# Patient Record
Sex: FEMALE | Race: WHITE | URBAN - METROPOLITAN AREA
[De-identification: names, ages, dates, MRNs, and addresses within clinical notes are randomized per-mention and may not be internally consistent; named-entity substitution may affect disease eponyms.]

---

## 2022-05-05 ENCOUNTER — OFFICE VISIT (OUTPATIENT)
Dept: URBAN - METROPOLITAN AREA CLINIC 64 | Facility: CLINIC | Age: 62
End: 2022-05-05
Payer: COMMERCIAL

## 2022-05-05 DIAGNOSIS — H25.13 AGE-RELATED NUCLEAR CATARACT, BILATERAL: ICD-10-CM

## 2022-05-05 DIAGNOSIS — H34.8310 TRIB RTNL VEIN OCCLUSION, RIGHT EYE, WITH MACULAR EDEMA: Primary | ICD-10-CM

## 2022-05-05 PROCEDURE — 92134 CPTRZ OPH DX IMG PST SGM RTA: CPT | Performed by: OPHTHALMOLOGY

## 2022-05-05 PROCEDURE — 99204 OFFICE O/P NEW MOD 45 MIN: CPT | Performed by: OPHTHALMOLOGY

## 2022-05-05 ASSESSMENT — INTRAOCULAR PRESSURE
OD: 12
OS: 11

## 2022-05-05 NOTE — IMPRESSION/PLAN
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 Meza Ards Plan: BRVO w CME OD   Prior injx Dr. Meza Ards  -- referred by Dr. Stefan Byrnes. TODAY txfr to BDP -- now only rare micro-cyst, no symptoms. However, high risk of recur. May need addt'l injx or FLTx future. NO injx today NEED to get an FA to properly assess - BRVO branch w few cysts - fovea intact RTC 4-5w overbook OK  - Pt has Cerebral Palsy . . . FA BASELINE
   LIKELY will do another series of 3 injx Summer '22. IF add'tl injx required, may add FLTx in Fall '22.

## 2022-05-05 NOTE — IMPRESSION/PLAN
Impression: Age-related nuclear cataract Plan: Keep all other care w Dr. Hannah Nixon et al.  Send letter.

## 2022-06-02 ENCOUNTER — OFFICE VISIT (OUTPATIENT)
Dept: URBAN - METROPOLITAN AREA CLINIC 64 | Facility: CLINIC | Age: 62
End: 2022-06-02
Payer: COMMERCIAL

## 2022-06-02 DIAGNOSIS — H34.8310 TRIB RTNL VEIN OCCLUSION, RIGHT EYE, WITH MACULAR EDEMA: Primary | ICD-10-CM

## 2022-06-02 DIAGNOSIS — H25.13 AGE-RELATED NUCLEAR CATARACT, BILATERAL: ICD-10-CM

## 2022-06-02 PROCEDURE — 67028 INJECTION EYE DRUG: CPT | Performed by: OPHTHALMOLOGY

## 2022-06-02 PROCEDURE — 92134 CPTRZ OPH DX IMG PST SGM RTA: CPT | Performed by: OPHTHALMOLOGY

## 2022-06-02 PROCEDURE — 92235 FLUORESCEIN ANGRPH MLTIFRAME: CPT | Performed by: OPHTHALMOLOGY

## 2022-06-02 PROCEDURE — 92250 FUNDUS PHOTOGRAPHY W/I&R: CPT | Performed by: OPHTHALMOLOGY

## 2022-06-02 ASSESSMENT — INTRAOCULAR PRESSURE
OD: 16
OS: 14

## 2022-06-02 NOTE — IMPRESSION/PLAN
Impression: Age-related nuclear cataract Plan: Keep all other care w Dr. Valentin Bradley et al.  Send letter.    TODAY exam mild cataract

## 2022-07-19 ENCOUNTER — OFFICE VISIT (OUTPATIENT)
Dept: URBAN - METROPOLITAN AREA CLINIC 64 | Facility: CLINIC | Age: 62
End: 2022-07-19
Payer: COMMERCIAL

## 2022-07-19 DIAGNOSIS — H34.8310 TRIBUTARY (BRANCH) RETINAL VEIN OCCLUSION, RIGHT EYE, WITH MACULAR EDEMA: Primary | ICD-10-CM

## 2022-07-19 DIAGNOSIS — H25.13 AGE-RELATED NUCLEAR CATARACT, BILATERAL: ICD-10-CM

## 2022-07-19 PROCEDURE — 99214 OFFICE O/P EST MOD 30 MIN: CPT | Performed by: OPHTHALMOLOGY

## 2022-07-19 PROCEDURE — 67028 INJECTION EYE DRUG: CPT | Performed by: OPHTHALMOLOGY

## 2022-07-19 PROCEDURE — 92134 CPTRZ OPH DX IMG PST SGM RTA: CPT | Performed by: OPHTHALMOLOGY

## 2022-07-19 ASSESSMENT — INTRAOCULAR PRESSURE
OD: 10
OS: 12

## 2022-07-19 NOTE — IMPRESSION/PLAN
Impression: BRVO w CME OD [id number] Prior injx Dr. Glen Schultz Plan: hx: Brittni Remy w CME OD   Prior injx Dr. Glen Schultz  --r/b Dr. Shaylee Crenshaw [facility] .Txfr to BDP w rare micro-cyst, less symptoms. . . high risk of recur. May need addt'l inj or FLTx]] FA confirmed BRVO branch w few cysts - Pt has Cerebral Palsy TODAY add Avastin OD inj (proc note) . . . . Future Exam ? 
   RTC 5-6w ND/inj/OCT plan Avastin OD #3/4 (Series Summer '22). Future FLTx ? NOW w add'tl injx required, likely add FLTx sup/temp in Fall '22. May need 4inj?

## 2022-07-19 NOTE — IMPRESSION/PLAN
Impression: Age-related nuclear cataract Plan:   TODAY exam mild cataract confirmed 1+ NSC  ---  Keep all other care w Dr. Mayo Fletcher et al.  Send letter.

## 2022-09-13 ENCOUNTER — OFFICE VISIT (OUTPATIENT)
Dept: URBAN - METROPOLITAN AREA CLINIC 64 | Facility: CLINIC | Age: 62
End: 2022-09-13
Payer: COMMERCIAL

## 2022-09-13 DIAGNOSIS — H34.8310 TRIBUTARY (BRANCH) RETINAL VEIN OCCLUSION, RIGHT EYE, WITH MACULAR EDEMA: Primary | ICD-10-CM

## 2022-09-13 PROCEDURE — 92134 CPTRZ OPH DX IMG PST SGM RTA: CPT | Performed by: OPHTHALMOLOGY

## 2022-09-13 PROCEDURE — 67028 INJECTION EYE DRUG: CPT | Performed by: OPHTHALMOLOGY

## 2022-09-13 ASSESSMENT — INTRAOCULAR PRESSURE
OS: 14
OD: 18

## 2022-09-13 NOTE — IMPRESSION/PLAN
Impression: BRVO w CME OD [id number] Prior injx Dr. Anjelica Fitzpatrick Plan: hx: Georgia John w CME OD   Prior injx Dr. Anjelica Fitzpatrick  --r/b Dr. Cannon Meigs. Alverto Sheerer .Txfr to BDP w rare micro-cyst, less symptoms. . . high risk of recur. May need addt'l inj or FLTx]] FA confirmed BRVO branch w few cysts - Pt has Cerebral Palsy TODAY add Avastin OD inj (proc note) . . . . Future Exam ? 
   RTC 5-6w dil, OCT, eval - h/o Avastin OD #4/4 (Summer '22). Then FLTx ? NOW w add'tl injx required, likely add FLTx sup/temp in Fall '22. May need 4inj?

## 2022-10-20 ENCOUNTER — OFFICE VISIT (OUTPATIENT)
Dept: URBAN - METROPOLITAN AREA CLINIC 64 | Facility: CLINIC | Age: 62
End: 2022-10-20
Payer: COMMERCIAL

## 2022-10-20 DIAGNOSIS — H34.8310 TRIBUTARY (BRANCH) RETINAL VEIN OCCLUSION, RIGHT EYE, WITH MACULAR EDEMA: Primary | ICD-10-CM

## 2022-10-20 DIAGNOSIS — H25.13 AGE-RELATED NUCLEAR CATARACT, BILATERAL: ICD-10-CM

## 2022-10-20 PROCEDURE — 92134 CPTRZ OPH DX IMG PST SGM RTA: CPT | Performed by: OPHTHALMOLOGY

## 2022-10-20 PROCEDURE — 67028 INJECTION EYE DRUG: CPT | Performed by: OPHTHALMOLOGY

## 2022-10-20 PROCEDURE — 99214 OFFICE O/P EST MOD 30 MIN: CPT | Performed by: OPHTHALMOLOGY

## 2022-10-20 ASSESSMENT — INTRAOCULAR PRESSURE
OS: 18
OD: 18

## 2022-10-20 NOTE — IMPRESSION/PLAN
Impression: BRVO w CME OD [id number] Prior injx Dr. Florentino Lee RECUR edema -- RESUME inj '22 Plan: hx: Mike Nava w CME OD   Prior injx Dr. Florentino Lee  --r/b Dr. Yolanda Hunt. Terrell Campbell .Txfr to BDP w rare micro-cyst, less symptoms. . . high risk of recur. May need addt'l inj or FLTx]] Pt has Cerebral Palsy -- FA confirmed BRVO branch w few cysts -
    TODAY Avastin OD inj (proc note) . . . . Future FLTx ? *(pt hesitant?)
    RTC 5-6w plan ND/inj pos OCT -- PLAN Avastin OD -- Future extend / reduce injx ? NOW w add'tl injx required, will CONTINUE injx. Fall '22 pt is CAUTIOUS ABOUT FLTx and would like to do [address] FUTURE if change mind, could RE-CONSIDER  add FLTx sup/temp OD?

## 2022-10-20 NOTE — IMPRESSION/PLAN
Impression: Age-related nuclear cataract Plan: TODAY updated repeated exam mild 1+ NSC  --- 
     Keep all other care w Dr. Jazmin Kirk et al.  Send letter.

## 2022-12-01 ENCOUNTER — OFFICE VISIT (OUTPATIENT)
Dept: URBAN - METROPOLITAN AREA CLINIC 64 | Facility: CLINIC | Age: 62
End: 2022-12-01
Payer: COMMERCIAL

## 2022-12-01 DIAGNOSIS — H34.8310 TRIBUTARY (BRANCH) RETINAL VEIN OCCLUSION, RIGHT EYE, WITH MACULAR EDEMA: Primary | ICD-10-CM

## 2022-12-01 PROCEDURE — 67028 INJECTION EYE DRUG: CPT | Performed by: OPHTHALMOLOGY

## 2022-12-01 PROCEDURE — 92134 CPTRZ OPH DX IMG PST SGM RTA: CPT | Performed by: OPHTHALMOLOGY

## 2022-12-01 ASSESSMENT — INTRAOCULAR PRESSURE
OS: 17
OD: 18

## 2022-12-01 NOTE — IMPRESSION/PLAN
Impression: BRVO w CME OD [id number] Prior injx Dr. Lisa Devlin -- C. Palsy pt RECUR edema -- RESUME inj '22 Plan: hx: Cloteal Hari w CME OD   Prior injx Dr. Lisa Devlin  --r/b Dr. Tamie Rao. Rene Manifold .Txfr to BDP w rare micro-cyst, less symptoms. . . high risk of recur. May need addt'l inj or FLTx]] Pt has Cerebral Palsy -- FA confirmed BRVO branch w few cysts -
     TODAY Avastin OD inj (proc note) . . . Rene Manifold (Future FLTx ? but  *pt hesitant* ?)
    RTC 5-6w dil, pos colors, eval - h/o Avstn OD -- Future extnd /reduce injx ? NOW w add'tl injx required, will CONTINUE injx. Fall '22 pt is CAUTIOUS ABOUT FLTx and would like to do [address] FUTURE if change mind, could RE-CONSIDER  add FLTx sup/temp OD?

## 2023-01-04 ENCOUNTER — OFFICE VISIT (OUTPATIENT)
Facility: LOCATION | Age: 63
End: 2023-01-04
Payer: COMMERCIAL

## 2023-01-04 DIAGNOSIS — H34.8310 TRIB RTNL VEIN OCCLUSION, RIGHT EYE, WITH MACULAR EDEMA: Primary | ICD-10-CM

## 2023-01-04 DIAGNOSIS — H25.13 AGE-RELATED NUCLEAR CATARACT, BILATERAL: ICD-10-CM

## 2023-01-04 PROCEDURE — 92134 CPTRZ OPH DX IMG PST SGM RTA: CPT | Performed by: OPHTHALMOLOGY

## 2023-01-04 PROCEDURE — 92002 INTRM OPH EXAM NEW PATIENT: CPT | Performed by: OPHTHALMOLOGY

## 2023-01-04 ASSESSMENT — INTRAOCULAR PRESSURE
OD: 17
OS: 14

## 2023-01-04 NOTE — IMPRESSION/PLAN
Impression: BRVO w CME OD [id number] Prior injx Dr. Jhonny Jackson -- C. Palsy pt RECUR edema -- RESUME inj '22 Plan: Exam and OCT demonstrate CME 5 wks since last Avastin. Rec Avastin today. Will schedule due to authorization.   

RTC 4 wks with OCT OU, poss Avastin OD

## 2023-01-04 NOTE — IMPRESSION/PLAN
Impression: Age-related nuclear cataract Plan: TODAY updated repeated exam mild 1+ NSC  --- 
     Keep all other care w Dr. Jalen Oneill et al.  Send letter.

## 2023-02-01 ENCOUNTER — OFFICE VISIT (OUTPATIENT)
Facility: LOCATION | Age: 63
End: 2023-02-01
Payer: COMMERCIAL

## 2023-02-01 DIAGNOSIS — H34.8310 TRIB RTNL VEIN OCCLUSION, RIGHT EYE, WITH MACULAR EDEMA: Primary | ICD-10-CM

## 2023-02-01 DIAGNOSIS — H25.13 AGE-RELATED NUCLEAR CATARACT, BILATERAL: ICD-10-CM

## 2023-02-01 DIAGNOSIS — G51.0 BELL'S PALSY: ICD-10-CM

## 2023-02-01 PROCEDURE — 99214 OFFICE O/P EST MOD 30 MIN: CPT | Performed by: OPHTHALMOLOGY

## 2023-02-01 PROCEDURE — 67028 INJECTION EYE DRUG: CPT | Performed by: OPHTHALMOLOGY

## 2023-02-01 PROCEDURE — 92134 CPTRZ OPH DX IMG PST SGM RTA: CPT | Performed by: OPHTHALMOLOGY

## 2023-02-01 ASSESSMENT — INTRAOCULAR PRESSURE
OD: 14
OS: 12

## 2023-02-01 NOTE — IMPRESSION/PLAN
Impression: Age-related nuclear cataract Plan: TODAY updated repeated exam mild 1+ NSC  --- 
     Keep all other care w Dr. Valentin Bradley et al.  Send letter.

## 2023-02-01 NOTE — IMPRESSION/PLAN
Impression: BRVO w CME OD [id number] Prior injx Dr. Gonzalez Shadow -- C. Palsy pt RECUR edema -- RESUME inj '22
s/p Avastin Dr. Tiffanie Tracy 12/1/2022 Plan: Exam and OCT demonstrate CME worse at 9 wk today vs 5 wks since last Avastin. Rec Avastin today. R/B/A discussed and patient elects to proceed. Intravitreal Avastin injected OD today without complication.  

RTC 4 wks OCT OU, straight Avastin OD #1/3

## 2023-02-01 NOTE — IMPRESSION/PLAN
Impression: Bell's palsy: G51.0. Plan: Affected right side of the face. Exam demonstrates Mild ptosis. No ectropion. Rec observation.

## 2023-04-26 ENCOUNTER — PROCEDURE (OUTPATIENT)
Facility: LOCATION | Age: 63
End: 2023-04-26
Payer: COMMERCIAL

## 2023-04-26 DIAGNOSIS — H34.8310 TRIBUTARY (BRANCH) RETINAL VEIN OCCLUSION, RIGHT EYE, WITH MACULAR EDEMA: Primary | ICD-10-CM

## 2023-04-26 PROCEDURE — 67028 INJECTION EYE DRUG: CPT | Performed by: OPHTHALMOLOGY

## 2023-04-26 PROCEDURE — 92134 CPTRZ OPH DX IMG PST SGM RTA: CPT | Performed by: OPHTHALMOLOGY

## 2023-04-26 ASSESSMENT — INTRAOCULAR PRESSURE
OS: 14
OD: 17

## 2023-06-07 ENCOUNTER — OFFICE VISIT (OUTPATIENT)
Facility: LOCATION | Age: 63
End: 2023-06-07
Payer: COMMERCIAL

## 2023-06-07 DIAGNOSIS — H25.13 AGE-RELATED NUCLEAR CATARACT, BILATERAL: ICD-10-CM

## 2023-06-07 DIAGNOSIS — G51.0 BELL'S PALSY: ICD-10-CM

## 2023-06-07 DIAGNOSIS — H02.409 PTOSIS OF EYELID: ICD-10-CM

## 2023-06-07 DIAGNOSIS — H34.8310 TRIB RTNL VEIN OCCLUSION, RIGHT EYE, WITH MACULAR EDEMA: Primary | ICD-10-CM

## 2023-06-07 PROCEDURE — 99214 OFFICE O/P EST MOD 30 MIN: CPT | Performed by: OPHTHALMOLOGY

## 2023-06-07 PROCEDURE — 67028 INJECTION EYE DRUG: CPT | Performed by: OPHTHALMOLOGY

## 2023-06-07 PROCEDURE — 92134 CPTRZ OPH DX IMG PST SGM RTA: CPT | Performed by: OPHTHALMOLOGY

## 2023-06-07 ASSESSMENT — INTRAOCULAR PRESSURE
OD: 14
OS: 16

## 2023-06-07 NOTE — IMPRESSION/PLAN
Impression: BRVO w CME OD [id number]
-Prior injx Dr. Sera Nguyen -- C. Palsy pt
-RECUR edema -- RESUME inj '22
-s/p Avastin Dr. Mariam Vega 12/1/2022
-s/p Avastin 04/26/2023-RCA Plan: Exam and OCT demonstrate CME still persists at 6 wks since last Avastin. Rec Avastin today and switch to [address]. R/B/A discussed and patient elects to proceed. Intravitreal Avastin injected OD today without complication.  

RTC 6-8 wks with OCT OU, poss [place]

## 2023-06-07 NOTE — IMPRESSION/PLAN
Impression: Age-related nuclear cataract Plan: Patient notes glare. Exam demonstrates a moderate cataract which has slowly progressed. Discussed R/B/A of surgery vs observation. Recommend observation. Warning symptoms discussed.

## 2023-08-02 ENCOUNTER — OFFICE VISIT (OUTPATIENT)
Facility: LOCATION | Age: 63
End: 2023-08-02
Payer: COMMERCIAL

## 2023-08-02 DIAGNOSIS — H34.8310 TRIBUTARY (BRANCH) RETINAL VEIN OCCLUSION, RIGHT EYE, WITH MACULAR EDEMA: Primary | ICD-10-CM

## 2023-08-02 DIAGNOSIS — H25.13 AGE-RELATED NUCLEAR CATARACT, BILATERAL: ICD-10-CM

## 2023-08-02 DIAGNOSIS — G51.0 BELL'S PALSY: ICD-10-CM

## 2023-08-02 DIAGNOSIS — H02.409 PTOSIS OF EYELID: ICD-10-CM

## 2023-08-02 PROCEDURE — 92134 CPTRZ OPH DX IMG PST SGM RTA: CPT | Performed by: OPHTHALMOLOGY

## 2023-08-02 PROCEDURE — 92012 INTRM OPH EXAM EST PATIENT: CPT | Performed by: OPHTHALMOLOGY

## 2023-08-02 PROCEDURE — 67028 INJECTION EYE DRUG: CPT | Performed by: OPHTHALMOLOGY

## 2023-08-02 ASSESSMENT — INTRAOCULAR PRESSURE
OS: 17
OD: 19

## 2023-09-27 ENCOUNTER — OFFICE VISIT (OUTPATIENT)
Facility: LOCATION | Age: 63
End: 2023-09-27
Payer: COMMERCIAL

## 2023-09-27 DIAGNOSIS — H25.13 AGE-RELATED NUCLEAR CATARACT, BILATERAL: ICD-10-CM

## 2023-09-27 DIAGNOSIS — H34.8310 TRIB RTNL VEIN OCCLUSION, RIGHT EYE, WITH MACULAR EDEMA: Primary | ICD-10-CM

## 2023-09-27 DIAGNOSIS — G51.0 BELL'S PALSY: ICD-10-CM

## 2023-09-27 DIAGNOSIS — H02.409 PTOSIS OF EYELID: ICD-10-CM

## 2023-09-27 PROCEDURE — 92134 CPTRZ OPH DX IMG PST SGM RTA: CPT | Performed by: OPHTHALMOLOGY

## 2023-09-27 PROCEDURE — 92014 COMPRE OPH EXAM EST PT 1/>: CPT | Performed by: OPHTHALMOLOGY

## 2023-09-27 PROCEDURE — 67028 INJECTION EYE DRUG: CPT | Performed by: OPHTHALMOLOGY

## 2023-09-27 ASSESSMENT — INTRAOCULAR PRESSURE
OS: 14
OD: 12

## 2023-11-22 PROCEDURE — 67028 INJECTION EYE DRUG: CPT | Performed by: OPHTHALMOLOGY

## 2023-11-22 PROCEDURE — 92134 CPTRZ OPH DX IMG PST SGM RTA: CPT | Performed by: OPHTHALMOLOGY

## 2023-11-22 PROCEDURE — 92012 INTRM OPH EXAM EST PATIENT: CPT | Performed by: OPHTHALMOLOGY

## 2024-01-17 ENCOUNTER — OFFICE VISIT (OUTPATIENT)
Facility: LOCATION | Age: 64
End: 2024-01-17
Payer: COMMERCIAL

## 2024-01-17 DIAGNOSIS — H34.8310 TRIBUTARY (BRANCH) RETINAL VEIN OCCLUSION, RIGHT EYE, WITH MACULAR EDEMA: Primary | ICD-10-CM

## 2024-01-17 DIAGNOSIS — G51.0 BELL'S PALSY: ICD-10-CM

## 2024-01-17 DIAGNOSIS — H25.13 AGE-RELATED NUCLEAR CATARACT, BILATERAL: ICD-10-CM

## 2024-01-17 DIAGNOSIS — H02.409 PTOSIS OF EYELID: ICD-10-CM

## 2024-01-17 PROCEDURE — 92014 COMPRE OPH EXAM EST PT 1/>: CPT | Performed by: OPHTHALMOLOGY

## 2024-01-17 PROCEDURE — 92134 CPTRZ OPH DX IMG PST SGM RTA: CPT | Performed by: OPHTHALMOLOGY

## 2024-01-17 PROCEDURE — 67028 INJECTION EYE DRUG: CPT | Performed by: OPHTHALMOLOGY

## 2024-01-17 ASSESSMENT — INTRAOCULAR PRESSURE
OD: 15
OS: 15

## 2024-03-27 ENCOUNTER — OFFICE VISIT (OUTPATIENT)
Facility: LOCATION | Age: 64
End: 2024-03-27
Payer: COMMERCIAL

## 2024-03-27 DIAGNOSIS — H25.13 AGE-RELATED NUCLEAR CATARACT, BILATERAL: ICD-10-CM

## 2024-03-27 DIAGNOSIS — H02.409 PTOSIS OF EYELID: ICD-10-CM

## 2024-03-27 DIAGNOSIS — H34.8310 TRIBUTARY (BRANCH) RETINAL VEIN OCCLUSION, RIGHT EYE, WITH MACULAR EDEMA: Primary | ICD-10-CM

## 2024-03-27 DIAGNOSIS — G51.0 BELL'S PALSY: ICD-10-CM

## 2024-03-27 PROCEDURE — 92134 CPTRZ OPH DX IMG PST SGM RTA: CPT | Performed by: OPHTHALMOLOGY

## 2024-03-27 PROCEDURE — 92012 INTRM OPH EXAM EST PATIENT: CPT | Performed by: OPHTHALMOLOGY

## 2024-03-27 PROCEDURE — 67028 INJECTION EYE DRUG: CPT | Performed by: OPHTHALMOLOGY

## 2024-03-27 ASSESSMENT — INTRAOCULAR PRESSURE
OS: 15
OD: 15

## 2024-06-19 ENCOUNTER — OFFICE VISIT (OUTPATIENT)
Facility: LOCATION | Age: 64
End: 2024-06-19
Payer: COMMERCIAL

## 2024-06-19 DIAGNOSIS — G51.0 BELL'S PALSY: ICD-10-CM

## 2024-06-19 DIAGNOSIS — H25.13 AGE-RELATED NUCLEAR CATARACT, BILATERAL: ICD-10-CM

## 2024-06-19 DIAGNOSIS — H34.8310 TRIBUTARY (BRANCH) RETINAL VEIN OCCLUSION, RIGHT EYE, WITH MACULAR EDEMA: Primary | ICD-10-CM

## 2024-06-19 DIAGNOSIS — H02.409 PTOSIS OF EYELID: ICD-10-CM

## 2024-06-19 PROCEDURE — 92134 CPTRZ OPH DX IMG PST SGM RTA: CPT | Performed by: OPHTHALMOLOGY

## 2024-06-19 PROCEDURE — 67028 INJECTION EYE DRUG: CPT | Performed by: OPHTHALMOLOGY

## 2024-06-19 PROCEDURE — 99214 OFFICE O/P EST MOD 30 MIN: CPT | Performed by: OPHTHALMOLOGY

## 2024-06-19 ASSESSMENT — INTRAOCULAR PRESSURE
OD: 13
OS: 12

## 2024-09-11 ENCOUNTER — OFFICE VISIT (OUTPATIENT)
Facility: LOCATION | Age: 64
End: 2024-09-11
Payer: COMMERCIAL

## 2024-09-11 DIAGNOSIS — H25.13 AGE-RELATED NUCLEAR CATARACT, BILATERAL: ICD-10-CM

## 2024-09-11 DIAGNOSIS — H02.409 PTOSIS OF EYELID: ICD-10-CM

## 2024-09-11 DIAGNOSIS — H34.8310 TRIB RTNL VEIN OCCLUSION, RIGHT EYE, WITH MACULAR EDEMA: Primary | ICD-10-CM

## 2024-09-11 DIAGNOSIS — G51.0 BELL'S PALSY: ICD-10-CM

## 2024-09-11 PROCEDURE — 67028 INJECTION EYE DRUG: CPT | Performed by: OPHTHALMOLOGY

## 2024-09-11 PROCEDURE — 92134 CPTRZ OPH DX IMG PST SGM RTA: CPT | Performed by: OPHTHALMOLOGY

## 2024-09-11 PROCEDURE — 99214 OFFICE O/P EST MOD 30 MIN: CPT | Performed by: OPHTHALMOLOGY

## 2024-09-11 ASSESSMENT — INTRAOCULAR PRESSURE
OD: 16
OS: 14

## 2025-01-15 ENCOUNTER — OFFICE VISIT (OUTPATIENT)
Facility: LOCATION | Age: 65
End: 2025-01-15
Payer: COMMERCIAL

## 2025-01-15 DIAGNOSIS — H34.8310 TRIBUTARY (BRANCH) RETINAL VEIN OCCLUSION, RIGHT EYE, WITH MACULAR EDEMA: Primary | ICD-10-CM

## 2025-01-15 DIAGNOSIS — G51.0 BELL'S PALSY: ICD-10-CM

## 2025-01-15 DIAGNOSIS — H25.13 AGE-RELATED NUCLEAR CATARACT, BILATERAL: ICD-10-CM

## 2025-01-15 DIAGNOSIS — H02.409 PTOSIS OF EYELID: ICD-10-CM

## 2025-01-15 PROCEDURE — 92134 CPTRZ OPH DX IMG PST SGM RTA: CPT | Performed by: OPHTHALMOLOGY

## 2025-01-15 PROCEDURE — 99214 OFFICE O/P EST MOD 30 MIN: CPT | Performed by: OPHTHALMOLOGY

## 2025-01-15 PROCEDURE — 67028 INJECTION EYE DRUG: CPT | Performed by: OPHTHALMOLOGY

## 2025-01-15 ASSESSMENT — INTRAOCULAR PRESSURE
OS: 15
OD: 11

## 2025-05-16 ENCOUNTER — OFFICE VISIT (OUTPATIENT)
Facility: LOCATION | Age: 65
End: 2025-05-16
Payer: COMMERCIAL

## 2025-05-16 DIAGNOSIS — H25.13 AGE-RELATED NUCLEAR CATARACT, BILATERAL: ICD-10-CM

## 2025-05-16 DIAGNOSIS — H02.409 PTOSIS OF EYELID: ICD-10-CM

## 2025-05-16 DIAGNOSIS — G51.0 BELL'S PALSY: ICD-10-CM

## 2025-05-16 DIAGNOSIS — H34.8310 TRIBUTARY (BRANCH) RETINAL VEIN OCCLUSION, RIGHT EYE, WITH MACULAR EDEMA: Primary | ICD-10-CM

## 2025-05-16 PROCEDURE — 92134 CPTRZ OPH DX IMG PST SGM RTA: CPT | Performed by: OPHTHALMOLOGY

## 2025-05-16 PROCEDURE — 92012 INTRM OPH EXAM EST PATIENT: CPT | Performed by: OPHTHALMOLOGY

## 2025-05-16 PROCEDURE — 67028 INJECTION EYE DRUG: CPT | Performed by: OPHTHALMOLOGY

## 2025-05-16 ASSESSMENT — INTRAOCULAR PRESSURE
OD: 18
OS: 15

## 2025-08-22 ENCOUNTER — OFFICE VISIT (OUTPATIENT)
Facility: LOCATION | Age: 65
End: 2025-08-22
Payer: COMMERCIAL

## 2025-08-22 DIAGNOSIS — H02.409 PTOSIS OF EYELID: ICD-10-CM

## 2025-08-22 DIAGNOSIS — H04.123 DRY EYE SYNDROME OF BILATERAL LACRIMAL GLANDS: ICD-10-CM

## 2025-08-22 DIAGNOSIS — H25.13 AGE-RELATED NUCLEAR CATARACT, BILATERAL: Primary | ICD-10-CM

## 2025-08-22 DIAGNOSIS — G51.0 BELL'S PALSY: ICD-10-CM

## 2025-08-22 DIAGNOSIS — H34.8310 TRIBUTARY (BRANCH) RETINAL VEIN OCCLUSION, RIGHT EYE, WITH MACULAR EDEMA: ICD-10-CM

## 2025-08-22 PROCEDURE — 99214 OFFICE O/P EST MOD 30 MIN: CPT | Performed by: OPHTHALMOLOGY

## 2025-08-22 PROCEDURE — 92134 CPTRZ OPH DX IMG PST SGM RTA: CPT | Performed by: OPHTHALMOLOGY

## 2025-08-22 PROCEDURE — 67028 INJECTION EYE DRUG: CPT | Performed by: OPHTHALMOLOGY

## 2025-08-22 ASSESSMENT — INTRAOCULAR PRESSURE
OS: 16
OD: 15